# Patient Record
Sex: FEMALE | Race: NATIVE HAWAIIAN OR OTHER PACIFIC ISLANDER
[De-identification: names, ages, dates, MRNs, and addresses within clinical notes are randomized per-mention and may not be internally consistent; named-entity substitution may affect disease eponyms.]

---

## 2018-11-29 ENCOUNTER — HOSPITAL ENCOUNTER (EMERGENCY)
Dept: HOSPITAL 14 - H.ER | Age: 33
Discharge: HOME | End: 2018-11-29
Payer: COMMERCIAL

## 2018-11-29 VITALS — RESPIRATION RATE: 16 BRPM | OXYGEN SATURATION: 99 %

## 2018-11-29 DIAGNOSIS — Z3A.12: ICD-10-CM

## 2018-11-29 DIAGNOSIS — N83.201: ICD-10-CM

## 2018-11-29 DIAGNOSIS — O34.81: ICD-10-CM

## 2018-11-29 DIAGNOSIS — O20.9: Primary | ICD-10-CM

## 2018-11-29 LAB
BUN SERPL-MCNC: 8 MG/DL (ref 7–17)
CALCIUM SERPL-MCNC: 9.6 MG/DL (ref 8.4–10.2)
ERYTHROCYTE [DISTWIDTH] IN BLOOD BY AUTOMATED COUNT: 12.2 % (ref 11.5–14.5)
GFR NON-AFRICAN AMERICAN: > 60
HGB BLD-MCNC: 12.4 G/DL (ref 12–16)
MCH RBC QN AUTO: 29.6 PG (ref 27–31)
MCHC RBC AUTO-ENTMCNC: 33.8 G/DL (ref 33–37)
MCV RBC AUTO: 87.6 FL (ref 81–99)
PLATELET # BLD: 335 K/UL (ref 130–400)
PMV BLD AUTO: 8 FL (ref 7.2–11.7)
RBC # BLD AUTO: 4.19 MIL/UL (ref 3.8–5.2)
WBC # BLD AUTO: 10.4 K/UL (ref 4.8–10.8)

## 2018-11-29 NOTE — ED PDOC
HPI: Female  Pain


Time Seen by Provider: 18 19:10


Chief Complaint (Nursing): Female Genitourinary


Chief Complaint (Provider): vaginal bleeding and abdominal cramps


History Per: Patient


History/Exam Limitations: no limitations


Onset/Duration Of Symptoms: Hrs (17:30)


Current Symptoms Are (Timing): Still Present


Quality Of Discomfort: Cramping


Associated Symptoms: denies: Fever, Chills, Nausea, Vomiting, Diarrhea


Additional Complaint(s): 





Nuvia Stroud is a 32 year old female, with a past medical history of bipolar 

disorder, who presents to the emergency department complaining of having heavy 

vaginal clotting and lower abdominal cramping onset since 17:30. Patient is 

[id number] at approximately x10 weeks gestation, LMP was on 18. Patient states

pain and bleeding is subsiding but is still having some active vaginal bleeding.

She denies any fever, chills, nausea, vomit or other medial complaints.





PMD: None provided. 


Last Menstral Period: 18


: 1


Para: 0


Miscarriage: 0





Past Medical History


Reviewed: Historical Data, Nursing Documentation, Vital Signs


Vital Signs: 





                                Last Vital Signs











Temp  98.3 F   18 18:04


 


Pulse  105 H  18 18:04


 


Resp  16   18 18:04


 


BP  117/78   18 18:04


 


Pulse Ox  99   18 18:04














- Medical History


PMH: No Chronic Diseases





- Surgical History


Surgical History: No Surg Hx





- Family History


Family History: States: Unknown Family Hx





- Allergies


Allergies/Adverse Reactions: 


                                    Allergies











Allergy/AdvReac Type Severity Reaction Status Date / Time


 


Sulfa (Sulfonamide Allergy  RASH Verified 18 18:03





Antibiotics)     














Review of Systems


ROS Statement: Except As Marked, All Systems Reviewed And Found Negative


Constitutional: Negative for: Fever, Chills


Gastrointestinal: Positive for: Abdominal Pain (cramps).  Negative for: Nausea, 

Vomiting


Genitourinary Female: Positive for: Vaginal Bleeding (clotting)





Physical Exam





- Reviewed


Nursing Documentation Reviewed: Yes


Vital Signs Reviewed: Yes





- Physical Exam


Appears: Positive for: No Acute Distress


Head Exam: Positive for: ATRAUMATIC, NORMAL INSPECTION, NORMOCEPHALIC


Skin: Positive for: Normal Color, Warm, Dry


Eye Exam: Positive for: Normal appearance, EOMI, PERRL


Neck: Positive for: Normal, Painless ROM


Cardiovascular/Chest: Positive for: Regular Rate, Rhythm.  Negative for: Murmur


Respiratory: Positive for: Normal Breath Sounds.  Negative for: Respiratory 

Distress


Gastrointestinal/Abdominal: Positive for: Normal Exam, Soft.  Negative for: 

Tenderness


Back: Positive for: Normal Inspection.  Negative for: L CVA Tenderness, R CVA 

Tenderness, Vertebral Tenderness


Extremity: Positive for: Normal ROM (upper and lower extremities).  Negative 

for: Deformity, Swelling


Neurologic/Psych: Positive for: Alert, Oriented





- Laboratory Results


Result Diagrams: 


                                 18 20:20





                                 18 20:20





- ECG


O2 Sat by Pulse Oximetry: 99 (RA)


Pulse Ox Interpretation: Normal





Medical Decision Making


Medical Decision Making: 





Time: 19:10


A/P: 31 y/o female in x10 weeks gestation presenting with vaginal bleeding. 

Vitals stable, otherwise well appearing but concerned for spontaneous AB vs thre

atened AB vs ectopic AB.





Initial Plan:





--Type and screen


--BMP


--Beta-HCG, Quantitative


--Urine pregnancy


--Urine dipstick


--CBC


--OB Pregnancy, Limited [US]


--Reevaluation





20:58


Obstretrics Ultrasound


Findings 


Uterus 


Single Live intrauterine gestation. 


CRL equivalent to 12 wks/0 days gestatioin 


Gestational sac diameter equivalent to 10 wks/0 days gestation 


Fetal Heart rate: 180 bpm. 


Jeri-gestational hemorrhage: None. 


Uterus measures 11.2 x 7.6 x 6 cm. No mass. 





Cervix 


Long and closed. No cervical abnormality seen. 


Right Ovary 


Measures 2.9 x 2.3 x 2 cm. No mass. Normal flow.  Anechoic structure measures 

1.2 x 1.3 x 0.9 cm. 


Left Ovary 


Not visualized. 


Free Fluid 


None. 


Other Findings 


None. 


Impression 


1.  Single live intrauterine gestation. 


2.  Right ovarian cystic structure.








22:02


Spoke with patient regarding diagnosis. Patient understands the importance of 

strict pelvic rest and no exertional activity until cleared by OB. Patient 

states she has an appointment with OB tomorrow. 





----------------------------------------------------

---------------------------------





Scribe Attestation:


Documented by Kevon Isbell, acting as a scribe for Bahman Ramirez MD.





Provider Scribe Attestation:


All medical record entries made by the Scribe were at my direction and 

personally dictated by me. I have reviewed the chart and agree that the record 

accurately reflects my personal performance of the history, physical exam, 

medical decision making, and the department course for this patient. I have also

 personally directed, reviewed, and agree with the discharge instructions and 

disposition.





Disposition





- Clinical Impression


Clinical Impression: 


 Vaginal bleeding during pregnancy








- Disposition


Referrals: 


[facility] [place] [Outside]


Disposition: Routine/Home


Disposition Time: 22:02


Condition: IMPROVED


Additional Instructions: 


Please followup with your OB-GYN tomorrow as scheduled. 


Instructions:  Threatened Miscarriage, Bleeding With Pregnancy (DC)


Forms:  [facility] (English)

## 2018-11-30 VITALS — TEMPERATURE: 98.4 F | DIASTOLIC BLOOD PRESSURE: 74 MMHG | SYSTOLIC BLOOD PRESSURE: 118 MMHG | HEART RATE: 72 BPM

## 2018-11-30 NOTE — US
Date of service: 



11/29/2018



PROCEDURE:  Limited obstetrical ultrasound



HISTORY:

10 wks preg, VB



COMPARISON:

None



TECHNIQUE:

Standard protocol for this study/examination.



FINDINGS:

LMP: 09/14/2018



Prior examinations from the current pregnancy: None



TECHNIQUE: Real-time 2D imaging, duplex and color Doppler.



FINDINGS:



Cardiac activity: Present



Rate: 180.0 BPM



Measurements:



Crown rump length: 5.40 cm



Gestational age based on CRL   12 weeks 



Gestational age 10 weeks based on gestational sac measurement 4.49 cm



Gestational age derived from  LMP:   10 weeks 6 days



PATO based on LMP: 06/21/2019



PATO based on biometry: 06/20/2019.



Gestational concordance  documented.



Yolk sac not identified 



Cervix: No Cervical abnormalities: Negative examination for cervical 

dilatation or effacement. 



Closed cervix measuring 2.90 cm



Subchorionic hemorrhage: None 



UTERUS: 6 x 7.6 x 11.0 cm.



ADNEXA:



Right: 2 x 2.3 x 2.9 cm.  Simple cyst 1.2 x 1.3 cm.  Normal Doppler 

arterial waveform documented.



Left: Not visible 



Fluid in the cul-de-sac: None



IMPRESSION:

11 weeks live intrauterine gestation.  Gestational concordance 

documented.



Simple cyst right adnexa.



Limitations of the current examination: None visible left adnexal.



___________________________________________________



Concordant findings (preliminary report) provided by [facility].